# Patient Record
Sex: FEMALE | Race: WHITE | NOT HISPANIC OR LATINO | Employment: OTHER | URBAN - NONMETROPOLITAN AREA
[De-identification: names, ages, dates, MRNs, and addresses within clinical notes are randomized per-mention and may not be internally consistent; named-entity substitution may affect disease eponyms.]

---

## 2017-03-31 DIAGNOSIS — S83.249A TEAR OF MEDIAL MENISCUS OF KNEE: Primary | ICD-10-CM

## 2017-04-04 ENCOUNTER — HOSPITAL ENCOUNTER (OUTPATIENT)
Dept: MRI IMAGING | Facility: HOSPITAL | Age: 67
Discharge: HOME | End: 2017-04-04
Attending: SPECIALIST | Admitting: SPECIALIST
Payer: COMMERCIAL

## 2017-04-04 PROCEDURE — 73721 MRI JNT OF LWR EXTRE W/O DYE: CPT | Mod: TC,RT

## 2017-11-26 ENCOUNTER — HEALTH MAINTENANCE LETTER (OUTPATIENT)
Age: 67
End: 2017-11-26

## 2019-02-03 ENCOUNTER — HOSPITAL ENCOUNTER (EMERGENCY)
Facility: HOSPITAL | Age: 69
Discharge: HOME | End: 2019-02-03
Attending: NURSE PRACTITIONER | Admitting: NURSE PRACTITIONER
Payer: MEDICARE

## 2019-02-03 VITALS
HEART RATE: 81 BPM | OXYGEN SATURATION: 99 % | RESPIRATION RATE: 18 BRPM | SYSTOLIC BLOOD PRESSURE: 130 MMHG | DIASTOLIC BLOOD PRESSURE: 88 MMHG | TEMPERATURE: 97.6 F

## 2019-02-03 DIAGNOSIS — J11.1 INFLUENZA-LIKE ILLNESS: ICD-10-CM

## 2019-02-03 LAB
FLUAV+FLUBV RNA SPEC QL NAA+PROBE: NEGATIVE
FLUAV+FLUBV RNA SPEC QL NAA+PROBE: NEGATIVE
RSV RNA SPEC NAA+PROBE: NEGATIVE
SPECIMEN SOURCE: NORMAL

## 2019-02-03 PROCEDURE — G0463 HOSPITAL OUTPT CLINIC VISIT: HCPCS

## 2019-02-03 PROCEDURE — 87631 RESP VIRUS 3-5 TARGETS: CPT | Performed by: NURSE PRACTITIONER

## 2019-02-03 PROCEDURE — 99213 OFFICE O/P EST LOW 20 MIN: CPT | Performed by: NURSE PRACTITIONER

## 2019-02-03 RX ORDER — OSELTAMIVIR PHOSPHATE 75 MG/1
75 CAPSULE ORAL 2 TIMES DAILY
Qty: 10 CAPSULE | Refills: 0 | Status: SHIPPED | OUTPATIENT
Start: 2019-02-03 | End: 2019-02-08

## 2019-02-03 ASSESSMENT — ENCOUNTER SYMPTOMS
ACTIVITY CHANGE: 0
SORE THROAT: 0
NECK PAIN: 0
DYSURIA: 0
ABDOMINAL PAIN: 0
SINUS PRESSURE: 0
CHILLS: 0
TROUBLE SWALLOWING: 0
FEVER: 0
NECK STIFFNESS: 0
NAUSEA: 0
RHINORRHEA: 0
SINUS PAIN: 0
COUGH: 1
WHEEZING: 0
STRIDOR: 0
DIARRHEA: 0
HEADACHES: 1
WEAKNESS: 0
PSYCHIATRIC NEGATIVE: 1
APPETITE CHANGE: 0
VOMITING: 0
SHORTNESS OF BREATH: 0

## 2019-02-03 NOTE — ED AVS SNAPSHOT
[facility]  [address] [address]  Phone:  [phone]                                    Lea Foy   MRN: [id number]    Department:  [facility]   Date of Visit:  2/3/2019           After Visit Summary Signature Page    I have received my discharge instructions, and my questions have been answered. I have discussed any challenges I see with this plan with the nurse or doctor.    ..........................................................................................................................................  Patient/Patient Representative Signature      ..........................................................................................................................................  Patient Representative Print Name and Relationship to Patient    ..................................................               ................................................  Date                                   Time    ..........................................................................................................................................  Reviewed by Signature/Title    ...................................................              ..............................................  Date                                               Time          22EPIC Rev 08/18

## 2019-02-03 NOTE — ED TRIAGE NOTES
is admitted for Influenza A, yesterday. No symptoms currently, does have headache but states probably due to lack of sleep.  Inpatient Dr suggested she get seen in UC to possibly start on treatment proactively.

## 2019-02-03 NOTE — ED PROVIDER NOTES
History   No chief complaint on file.    The history is provided by the patient. No  was used.     Lea Foy is a 68 year old female who presents for possible influenza. Her  was hospitalized yesterday with influenza A. She has body aches and a frontal headache that started today. Denies fever, chills, or night sweats. Eating and drinking well. Bowel and bladder are working well. No antibiotic use in the past 30 days. She did receive an influenza vaccine this flu season.       Allergies:  Allergies   Allergen Reactions     Valdecoxib Swelling     Aleve      Oxycodone      Penicillin G Itching and Rash       Problem List:    There are no active problems to display for this patient.       Past Medical History:    Past Medical History:   Diagnosis Date     Chronic pain      Depression      Gastro-oesophageal reflux disease      Hyperlipidemia      Hypertension      PONV (postoperative nausea and vomiting)        Past Surgical History:    Past Surgical History:   Procedure Laterality Date     ARTHROPLASTY KNEE      left knee     ARTHROSCOPY SHOULDER DEBRIDEMENT       COLONOSCOPY       ENDOSCOPY UPPER, COLONOSCOPY, COMBINED  5/17/2013    Procedure: COMBINED ENDOSCOPY UPPER, COLONOSCOPY;  COLONOSCOPY, ESOPHAGOGASTRODUODENOSCOPY;  Surgeon: Prasanth Ayers MD;  Location: [facility]     GYN SURGERY      tubal ligation, hysterectomy       Family History:    No family history on file.    Social History:  Marital Status:   [2]  Social History     Tobacco Use     Smoking status: Never Smoker   Substance Use Topics     Alcohol use: No     Drug use: No        Medications:      oseltamivir (TAMIFLU) 75 MG capsule   ALPRAZolam (XANAX) 0.25 MG tablet   amitriptyline (ELAVIL) 25 MG tablet   Aspirin 81 MG TBDP   calcium citrate-vitamin D (CITRACAL) 315-200 MG-UNIT TABS   ESTRADIOL PO   Flaxseed, Linseed, (FLAX SEED OIL) 1000 MG capsule   Multiple Vitamins-Minerals (MULTIVITAMIN OR)   omeprazole  (PRILOSEC) 20 MG capsule   simvastatin (ZOCOR) 40 MG tablet   zolpidem (AMBIEN) 10 MG tablet         Review of Systems   Constitutional: Negative for activity change, appetite change, chills and fever.   HENT: Positive for congestion. Negative for ear discharge, ear pain, rhinorrhea, sinus pressure, sinus pain, sore throat and trouble swallowing.    Respiratory: Positive for cough. Negative for shortness of breath, wheezing and stridor.    Cardiovascular: Negative for chest pain.   Gastrointestinal: Negative for abdominal pain, diarrhea, nausea and vomiting.   Genitourinary: Negative for dysuria.   Musculoskeletal: Negative for neck pain and neck stiffness.        Body aches.    Skin: Negative for rash.   Neurological: Positive for headaches. Negative for weakness.        Frontal headache.    Psychiatric/Behavioral: Negative.        Physical Exam   BP: 130/88  Pulse: 81  Temp: 97.6  F (36.4  C)  Resp: 18  SpO2: 99 %      Physical Exam   Constitutional: She is oriented to person, place, and time. She appears well-developed and well-nourished. No distress.   HENT:   Head: Normocephalic.   Right Ear: External ear normal.   Left Ear: External ear normal.   Mouth/Throat: Oropharynx is clear and moist. No oropharyngeal exudate.   Eyes: Conjunctivae and EOM are normal. Pupils are equal, round, and reactive to light. Right eye exhibits no discharge. Left eye exhibits no discharge.   Neck: Normal range of motion. Neck supple.   Cardiovascular: Normal rate, regular rhythm and normal heart sounds.   No murmur heard.  Pulmonary/Chest: Effort normal. No stridor. No respiratory distress. She has no wheezes. She has no rales.   Abdominal: Soft. She exhibits no distension.   Musculoskeletal: Normal range of motion.   Lymphadenopathy:     She has no cervical adenopathy.   Neurological: She is alert and oriented to person, place, and time. She exhibits normal muscle tone. Coordination normal.   Skin: Skin is warm and dry. Capillary  refill takes less than 2 seconds. No rash noted. She is not diaphoretic.   Psychiatric: She has a normal mood and affect. Her behavior is normal.   Nursing note and vitals reviewed.      ED Course     Procedures    Results for orders placed or performed during the hospital encounter of 02/03/19   Influenza A and B and RSV PCR   Result Value Ref Range    Specimen Description Nares     Influenza A PCR Negative NEG^Negative    Influenza B PCR Negative NEG^Negative    Resp Syncytial Virus Negative NEG^Negative       Assessments & Plan (with Medical Decision Making)     Negative influenza swab. She has influenza like illness. Will treat with Tamiflu.     Discussed plan of care. She verbalized understanding. All questions answered.     I have reviewed the nursing notes.    I have reviewed the findings, diagnosis, plan and need for follow up with the patient.  Discharged in stable condition.        Medication List      Started    oseltamivir 75 MG capsule  Commonly known as:  TAMIFLU  75 mg, Oral, 2 TIMES DAILY            Final diagnoses:   Influenza-like illness     Take Tamiflu as ordered. Take with food.   Increase water intake.   Rest.   Good hand washing.   Follow up with PCP with any increase in symptoms or concerns.   Return to urgent care or emergency department with any increase in symptoms or concerns.     TD Finley  2/3/2019  9:40 AM  [facility]       Solange Lott NP  02/03/19 1958       Solange Lott NP  02/03/19 1959

## 2019-02-03 NOTE — ED TRIAGE NOTES
Pt in for complaints of exposure to influenza A. MD referred pt to UC for possible initiation of treatment.

## 2019-02-03 NOTE — DISCHARGE INSTRUCTIONS
Take Tamiflu as ordered. Take with food.   Increase water intake.   Rest.   Good hand washing.   Follow up with PCP with any increase in symptoms or concerns.   Return to urgent care or emergency department with any increase in symptoms or concerns.

## 2020-03-02 ENCOUNTER — HEALTH MAINTENANCE LETTER (OUTPATIENT)
Age: 70
End: 2020-03-02

## 2020-10-24 ENCOUNTER — ALLIED HEALTH/NURSE VISIT (OUTPATIENT)
Dept: FAMILY MEDICINE | Facility: OTHER | Age: 70
End: 2020-10-24
Attending: FAMILY MEDICINE
Payer: MEDICARE

## 2020-10-24 DIAGNOSIS — R09.81 NASAL CONGESTION: ICD-10-CM

## 2020-10-24 DIAGNOSIS — R50.9 FEVER: ICD-10-CM

## 2020-10-24 DIAGNOSIS — R05.9 COUGH: Primary | ICD-10-CM

## 2020-10-24 PROCEDURE — C9803 HOPD COVID-19 SPEC COLLECT: HCPCS

## 2020-10-24 PROCEDURE — U0003 INFECTIOUS AGENT DETECTION BY NUCLEIC ACID (DNA OR RNA); SEVERE ACUTE RESPIRATORY SYNDROME CORONAVIRUS 2 (SARS-COV-2) (CORONAVIRUS DISEASE [COVID-19]), AMPLIFIED PROBE TECHNIQUE, MAKING USE OF HIGH THROUGHPUT TECHNOLOGIES AS DESCRIBED BY CMS-2020-01-R: HCPCS | Mod: ZL | Performed by: FAMILY MEDICINE

## 2020-10-24 PROCEDURE — 99207 PR NO CHARGE NURSE ONLY: CPT

## 2020-10-24 NOTE — NURSING NOTE
Patient swabbed for COVID-19 testing.  Cough, congestion  Rosalina Moser LPN on 10/24/2020 at 3:05 PM

## 2020-10-26 LAB
SARS-COV-2 RNA SPEC QL NAA+PROBE: NOT DETECTED
SPECIMEN SOURCE: NORMAL

## 2021-01-03 ENCOUNTER — HOSPITAL ENCOUNTER (EMERGENCY)
Facility: HOSPITAL | Age: 71
Discharge: HOME | End: 2021-01-03
Attending: NURSE PRACTITIONER | Admitting: NURSE PRACTITIONER
Payer: MEDICARE

## 2021-01-03 VITALS
RESPIRATION RATE: 16 BRPM | OXYGEN SATURATION: 97 % | DIASTOLIC BLOOD PRESSURE: 85 MMHG | SYSTOLIC BLOOD PRESSURE: 132 MMHG | TEMPERATURE: 99 F | HEART RATE: 106 BPM

## 2021-01-03 DIAGNOSIS — J01.40 ACUTE NON-RECURRENT PANSINUSITIS: Primary | ICD-10-CM

## 2021-01-03 PROCEDURE — G0463 HOSPITAL OUTPT CLINIC VISIT: HCPCS

## 2021-01-03 PROCEDURE — 99213 OFFICE O/P EST LOW 20 MIN: CPT | Performed by: NURSE PRACTITIONER

## 2021-01-03 RX ORDER — DOXYCYCLINE HYCLATE 100 MG
100 TABLET ORAL 2 TIMES DAILY
Qty: 14 TABLET | Refills: 0 | Status: SHIPPED | OUTPATIENT
Start: 2021-01-03 | End: 2021-01-10

## 2021-01-03 ASSESSMENT — ENCOUNTER SYMPTOMS
CHILLS: 0
COUGH: 1
FEVER: 0
SINUS PRESSURE: 1
SHORTNESS OF BREATH: 0
SINUS PAIN: 1

## 2021-01-03 NOTE — ED PROVIDER NOTES
History     Chief Complaint   Patient presents with     Nasal Congestion     HPI  Lea Foy is a 70 year old female who presents to urgent care during current COVID-19 pandemic for concerns of a sinus infection.  Onset 1 week ago.  She reports right-sided frontal headache, right-sided facial pressure neck along with, tooth pain.  No fevers or chills.  This morning she states that she had blood tinged mucus when she blew her nose.  No recent head trauma.  No nosebleeds.  She is eating and drinking of minimal difficulty.  She does have a mild cough.  She notes that she tested negative for COVID-19 on 12/28/2020.  Patient notes she has not had a sinus infection for a long time.  She has tried Mucinex, OTC sinus/cold medication with minimal effectiveness.  Symptoms appear to be worsening.    Allergies:  Allergies   Allergen Reactions     Valdecoxib Swelling     Aleve      Oxycodone      Penicillin G Itching and Rash       Problem List:    There are no active problems to display for this patient.       Past Medical History:    Past Medical History:   Diagnosis Date     Chronic pain      Depression      Gastro-oesophageal reflux disease      Hyperlipidemia      Hypertension      PONV (postoperative nausea and vomiting)        Past Surgical History:    Past Surgical History:   Procedure Laterality Date     ARTHROPLASTY KNEE      left knee     ARTHROSCOPY SHOULDER DEBRIDEMENT       COLONOSCOPY       ENDOSCOPY UPPER, COLONOSCOPY, COMBINED  5/17/2013    Procedure: COMBINED ENDOSCOPY UPPER, COLONOSCOPY;  COLONOSCOPY, ESOPHAGOGASTRODUODENOSCOPY;  Surgeon: Prasanth Ayers MD;  Location: [facility]     GYN SURGERY      tubal ligation, hysterectomy       Family History:    No family history on file.    Social History:  Marital Status:   [2]  Social History     Tobacco Use     Smoking status: Never Smoker   Substance Use Topics     Alcohol use: No     Drug use: No        Medications:         doxycycline hyclate  (VIBRA-TABS) 100 MG tablet       ALPRAZolam (XANAX) 0.25 MG tablet       amitriptyline (ELAVIL) 25 MG tablet       Aspirin 81 MG TBDP       calcium citrate-vitamin D (CITRACAL) 315-200 MG-UNIT TABS       ESTRADIOL PO       Flaxseed, Linseed, (FLAX SEED OIL) 1000 MG capsule       Multiple Vitamins-Minerals (MULTIVITAMIN OR)       omeprazole (PRILOSEC) 20 MG capsule       simvastatin (ZOCOR) 40 MG tablet       zolpidem (AMBIEN) 10 MG tablet          Review of Systems   Constitutional: Negative for chills and fever.   HENT: Positive for congestion, sinus pressure and sinus pain.    Respiratory: Positive for cough. Negative for shortness of breath.    All other systems reviewed and are negative.      Physical Exam   BP: 132/85  Pulse: 106  Temp: 99  F (37.2  C)  Resp: 16  SpO2: 97 %      Physical Exam  Vitals signs and nursing note reviewed.   Constitutional:       Appearance: Normal appearance. She is not ill-appearing or toxic-appearing.   HENT:      Head: Normocephalic and atraumatic.      Jaw: No trismus.      Right Ear: Tympanic membrane and ear canal normal.      Left Ear: Tympanic membrane and ear canal normal.      Nose: Congestion present.      Right Nostril: No foreign body, epistaxis, septal hematoma or occlusion.      Left Nostril: No foreign body, epistaxis, septal hematoma or occlusion.      Right Turbinates: Not swollen.      Left Turbinates: Not swollen.      Right Sinus: No maxillary sinus tenderness or frontal sinus tenderness.      Left Sinus: No maxillary sinus tenderness or frontal sinus tenderness.      Mouth/Throat:      Mouth: Mucous membranes are moist.   Eyes:      Extraocular Movements: Extraocular movements intact.      Pupils: Pupils are equal, round, and reactive to light.   Neck:      Musculoskeletal: Normal range of motion.   Cardiovascular:      Rate and Rhythm: Normal rate and regular rhythm.      Heart sounds: Normal heart sounds.   Pulmonary:      Effort: Pulmonary effort is normal.       Breath sounds: Normal breath sounds.   Musculoskeletal: Normal range of motion.   Lymphadenopathy:      Cervical: No cervical adenopathy.   Skin:     General: Skin is warm and dry.      Capillary Refill: Capillary refill takes less than 2 seconds.   Neurological:      Mental Status: She is alert and oriented to person, place, and time.         ED Course        Procedures               No results found for this or any previous visit (from the past 24 hour(s)).    Medications - No data to display    Assessments & Plan (with Medical Decision Making)     I have reviewed the nursing notes.    I have reviewed the findings, diagnosis, plan and need for follow up with the patient.  Pleasant 70-year-old female that presented to urgent care for concerns of a sinus infection x1 week.  Tested negative for COVID-19 on 12/28/2020.  She does have nasal congestion.  No sinus tenderness to palpation.  Sinus pressure and pain is all on the right side of her face.  She has tried OTC medications with minimal effectiveness.  Discussed with patient that the sinus infections are likely viral.  Considering she has tried symptomatic treatment for 1 week with no significant improvements will prescribe an antibiotic.  Recommended she continue with oral decongestant, pushing fluids and try nasal washes.  Follow-up with PCP if no improvement in symptoms.  Return to ED/UC for worsening or concerning symptoms.  Patient verbalized understanding.    This document was prepared using a combination of typing and voice generated software.  While every attempt was made for accuracy, spelling and grammatical errors may exist.    New Prescriptions    DOXYCYCLINE HYCLATE (VIBRA-TABS) 100 MG TABLET    Take 1 tablet (100 mg) by mouth 2 times daily for 7 days       Final diagnoses:   Acute non-recurrent pansinusitis       1/3/2021   [facility]     Mpofu, Prudence, CNP  01/03/21 1102

## 2021-01-03 NOTE — ED AVS SNAPSHOT
[facility]  [address] [address]  Phone: [phone]                                    Lea Foy   MRN: [id number]    Department: [facility]   Date of Visit: 1/3/2021           After Visit Summary Signature Page    I have received my discharge instructions, and my questions have been answered. I have discussed any challenges I see with this plan with the nurse or doctor.    ..........................................................................................................................................  Patient/Patient Representative Signature      ..........................................................................................................................................  Patient Representative Print Name and Relationship to Patient    ..................................................               ................................................  Date                                   Time    ..........................................................................................................................................  Reviewed by Signature/Title    ...................................................              ..............................................  Date                                               Time          22EPIC Rev 08/18

## 2021-01-03 NOTE — DISCHARGE INSTRUCTIONS
Take antibiotic as prescribed until finished.  Drink plenty of fluids, do nasal saline washes and continue taking Mucinex.    Follow-up with your doctor as needed.    Return to emergency department for worsening concerning symptoms.

## 2021-01-03 NOTE — ED TRIAGE NOTES
PT presents today with c/o possible sinus infections started 4 days ago with head pressure and now she has bloody ,usuc when she blows her nose.

## 2021-04-18 ENCOUNTER — HEALTH MAINTENANCE LETTER (OUTPATIENT)
Age: 71
End: 2021-04-18

## 2021-06-14 ENCOUNTER — MEDICAL CORRESPONDENCE (OUTPATIENT)
Dept: HEALTH INFORMATION MANAGEMENT | Facility: HOSPITAL | Age: 71
End: 2021-06-14

## 2021-07-07 ENCOUNTER — HOSPITAL ENCOUNTER (OUTPATIENT)
Dept: BONE DENSITY | Facility: HOSPITAL | Age: 71
Discharge: HOME | End: 2021-07-07
Attending: FAMILY MEDICINE | Admitting: FAMILY MEDICINE
Payer: MEDICARE

## 2021-07-07 DIAGNOSIS — M81.0 OSTEOPOROSIS: ICD-10-CM

## 2021-07-07 PROCEDURE — 77080 DXA BONE DENSITY AXIAL: CPT

## 2021-10-03 ENCOUNTER — HEALTH MAINTENANCE LETTER (OUTPATIENT)
Age: 71
End: 2021-10-03

## 2022-03-19 ENCOUNTER — HEALTH MAINTENANCE LETTER (OUTPATIENT)
Age: 72
End: 2022-03-19

## 2022-05-14 ENCOUNTER — HEALTH MAINTENANCE LETTER (OUTPATIENT)
Age: 72
End: 2022-05-14

## 2022-09-04 ENCOUNTER — HEALTH MAINTENANCE LETTER (OUTPATIENT)
Age: 72
End: 2022-09-04

## 2023-06-03 ENCOUNTER — HEALTH MAINTENANCE LETTER (OUTPATIENT)
Age: 73
End: 2023-06-03

## 2023-08-18 ENCOUNTER — HOSPITAL ENCOUNTER (EMERGENCY)
Facility: HOSPITAL | Age: 73
Discharge: HOME | End: 2023-08-18
Attending: NURSE PRACTITIONER | Admitting: NURSE PRACTITIONER
Payer: MEDICARE

## 2023-08-18 ENCOUNTER — APPOINTMENT (OUTPATIENT)
Dept: GENERAL RADIOLOGY | Facility: HOSPITAL | Age: 73
End: 2023-08-18
Attending: NURSE PRACTITIONER
Payer: MEDICARE

## 2023-08-18 VITALS
SYSTOLIC BLOOD PRESSURE: 145 MMHG | RESPIRATION RATE: 18 BRPM | DIASTOLIC BLOOD PRESSURE: 87 MMHG | TEMPERATURE: 97.9 F | OXYGEN SATURATION: 95 % | HEART RATE: 88 BPM

## 2023-08-18 DIAGNOSIS — S82.65XA CLOSED NONDISPLACED FRACTURE OF LATERAL MALLEOLUS OF LEFT FIBULA, INITIAL ENCOUNTER: ICD-10-CM

## 2023-08-18 PROCEDURE — 99213 OFFICE O/P EST LOW 20 MIN: CPT | Performed by: NURSE PRACTITIONER

## 2023-08-18 PROCEDURE — G0463 HOSPITAL OUTPT CLINIC VISIT: HCPCS

## 2023-08-18 PROCEDURE — 73610 X-RAY EXAM OF ANKLE: CPT | Mod: LT

## 2023-08-18 RX ORDER — HYDROCODONE BITARTRATE AND ACETAMINOPHEN 5; 325 MG/1; MG/1
1 TABLET ORAL EVERY 4 HOURS PRN
Qty: 10 TABLET | Refills: 0 | Status: SHIPPED | OUTPATIENT
Start: 2023-08-18 | End: 2023-08-21

## 2023-08-18 NOTE — ED PROVIDER NOTES
History     Chief Complaint   Patient presents with    Ankle Pain     HPI  Lea Foy is a 72 year old female who presents today with a CC of left ankle pain.  She inverted her left ankle walking this afternoon about an hour prior to presentation.  She reports she felt a pop in the lateral portion of her ankle.    The pain is 2/10, she was able to hobble on it with minimal weight bearing without much pain.  No numbness or tingling.  She has not taken anything for pain.      No history of ankle injury or surgery.      Allergies:  Allergies   Allergen Reactions    Valdecoxib Swelling    Aleve     Dextran     Naproxen Other (See Comments)     GI upset    Oxycodone     Penicillins     Penicillin G Itching and Rash       Problem List:    There are no problems to display for this patient.       Past Medical History:    Past Medical History:   Diagnosis Date    Chronic pain     Depression     Gastro-oesophageal reflux disease     Hyperlipidemia     Hypertension     PONV (postoperative nausea and vomiting)        Past Surgical History:    Past Surgical History:   Procedure Laterality Date    ARTHROPLASTY KNEE      left knee    ARTHROSCOPY SHOULDER DEBRIDEMENT      COLONOSCOPY      ENDOSCOPY UPPER, COLONOSCOPY, COMBINED  5/17/2013    Procedure: COMBINED ENDOSCOPY UPPER, COLONOSCOPY;  COLONOSCOPY, ESOPHAGOGASTRODUODENOSCOPY;  Surgeon: Prasanth Ayers MD;  Location: [facility]    GYN SURGERY      tubal ligation, hysterectomy       Family History:    No family history on file.    Social History:  Marital Status:   [2]  Social History     Tobacco Use    Smoking status: Never   Substance Use Topics    Alcohol use: No    Drug use: No        Medications:    ALPRAZolam (XANAX) 0.25 MG tablet  amitriptyline (ELAVIL) 25 MG tablet  Aspirin 81 MG TBDP  calcium citrate-vitamin D (CITRACAL) 315-200 MG-UNIT TABS  ESTRADIOL PO  Flaxseed, Linseed, (FLAX SEED OIL) 1000 MG capsule  HYDROcodone-acetaminophen (NORCO) 5-325 MG  tablet  Multiple Vitamins-Minerals (MULTIVITAMIN OR)  omeprazole (PRILOSEC) 20 MG capsule  simvastatin (ZOCOR) 40 MG tablet  zolpidem (AMBIEN) 10 MG tablet          Review of Systems   Constitutional:  Negative for activity change, appetite change, fatigue and fever.   Musculoskeletal:  Positive for arthralgias.   Neurological:  Negative for numbness.       Physical Exam   BP: 145/87  Pulse: 88  Temp: 97.9  F (36.6  C)  Resp: 18  SpO2: 95 %      Physical Exam  Vitals and nursing note reviewed.   Constitutional:       Appearance: Normal appearance.   HENT:      Head: Normocephalic and atraumatic.   Musculoskeletal:      Left ankle: Swelling present. No deformity, ecchymosis or lacerations. Tenderness present over the lateral malleolus. Normal range of motion.      Comments: Left ankle skin is intact, no erythema, no ecchymosis.  There is localized tenderness over the lateral malleolus.  There is also localized swelling over this area.  She has good sensation to light touch.  Pedal pulse is intact.   Neurological:      Mental Status: She is alert.         ED Course     Results for orders placed or performed during the hospital encounter of 08/18/23 (from the past 24 hour(s))   XR Ankle Left G/E 3 Views    Narrative    PROCEDURE:  XR ANKLE LEFT G/E 3 VIEWS    HISTORY: pt fell and twisted ankle having ankle pain and some swelling  no previous hx of fracture    COMPARISON:  None.    TECHNIQUE:  XR ANKLE LEFT 3 VIEWS    FINDINGS:   Acute fracture through the inferior aspect of lateral malleolus  extending to the talofibular joint space. No dislocation. The joint  spaces are preserved.     No foreign body is seen.     Soft tissue swelling about the ankle.        Impression    IMPRESSION:   Acute fracture through the inferior lateral malleolus.    VINEET CAMEJO MD         SYSTEM ID:  [id number]       Medications - No data to display    Assessments & Plan (with Medical Decision Making)     I have reviewed the nursing  notes.    I have reviewed the findings, diagnosis, plan and need for follow up with the patient.    Medical Decision Making  The patient's presentation was of low complexity (an acute and uncomplicated illness or injury).    The patient's evaluation involved:  ordering and/or review of 1 test(s) in this encounter (see separate area of note for details)    The patient's management necessitated moderate risk (prescription drug management including medications given in the ED).        Discharge Medication List as of 8/18/2023  6:02 PM        START taking these medications    Details   HYDROcodone-acetaminophen (NORCO) 5-325 MG tablet Take 1 tablet by mouth every 4 hours as needed for severe pain, Disp-10 tablet, R-0, E-Prescribe             Final diagnoses:   Closed nondisplaced fracture of lateral malleolus of left fibula, initial encounter     Rest, ice, elevate as much as possible.  Ice on for 20 minutes every hour.  Use over-the-counter analgesics per package directions for mild to moderate pain.  Hydrocodone prescribed as needed for severe pain.  [facility] given [facility].  I also gave her a DME order for knee scooter.  She declines need for crutches at this time, states that she has been at home.  She was fitted with a Ortho boot.      8/18/2023   [facility]       Deepthi Hankins NP  08/19/23 9428

## 2023-08-18 NOTE — ED TRIAGE NOTES
Pt presents with c/o having left ankle pain due to stepping in a hole and rolling it   Pt states hearing a pop and a crack  Pt states is able to bear some weight on it  Limited ROM due to pain   Denies any previous hx of fracture or injury to ankle   Happened today   No otc meds taken today has been icing, which did help some

## 2023-08-19 ASSESSMENT — ENCOUNTER SYMPTOMS
ACTIVITY CHANGE: 0
FATIGUE: 0
ARTHRALGIAS: 1
FEVER: 0
NUMBNESS: 0
APPETITE CHANGE: 0

## 2023-08-22 DIAGNOSIS — R06.02 SOB (SHORTNESS OF BREATH): Primary | ICD-10-CM

## 2023-09-12 DIAGNOSIS — R06.02 SOB (SHORTNESS OF BREATH): Primary | ICD-10-CM

## 2023-10-24 ENCOUNTER — HOSPITAL ENCOUNTER (OUTPATIENT)
Dept: RESPIRATORY THERAPY | Facility: HOSPITAL | Age: 73
Discharge: HOME | End: 2023-10-24
Attending: FAMILY MEDICINE | Admitting: INTERNAL MEDICINE
Payer: MEDICARE

## 2023-10-24 DIAGNOSIS — R06.02 SOB (SHORTNESS OF BREATH): Primary | ICD-10-CM

## 2023-10-24 LAB
ALLEN'S TEST: YES
BASE EXCESS BLDA CALC-SCNC: 1.4 MMOL/L (ref -9–1.8)
HCO3 BLD-SCNC: 26 MMOL/L (ref 21–28)
HGB BLD-MCNC: 14.7 G/DL (ref 11.7–15.7)
O2/TOTAL GAS SETTING VFR VENT: 21 %
OXYHGB MFR BLD: 95 % (ref 92–100)
PCO2 BLD: 38 MM HG (ref 35–45)
PH BLD: 7.44 [PH] (ref 7.35–7.45)
PO2 BLD: 73 MM HG (ref 80–105)

## 2023-10-24 PROCEDURE — 94726 PLETHYSMOGRAPHY LUNG VOLUMES: CPT

## 2023-10-24 PROCEDURE — 94010 BREATHING CAPACITY TEST: CPT

## 2023-10-24 PROCEDURE — 94729 DIFFUSING CAPACITY: CPT

## 2023-10-24 PROCEDURE — 94729 DIFFUSING CAPACITY: CPT | Mod: 26 | Performed by: INTERNAL MEDICINE

## 2023-10-24 PROCEDURE — 94010 BREATHING CAPACITY TEST: CPT | Mod: 26 | Performed by: INTERNAL MEDICINE

## 2023-10-24 PROCEDURE — 82805 BLOOD GASES W/O2 SATURATION: CPT | Performed by: FAMILY MEDICINE

## 2023-10-24 PROCEDURE — 94726 PLETHYSMOGRAPHY LUNG VOLUMES: CPT | Mod: 26 | Performed by: INTERNAL MEDICINE

## 2023-10-24 PROCEDURE — 85018 HEMOGLOBIN: CPT | Performed by: FAMILY MEDICINE

## 2023-10-24 PROCEDURE — 36600 WITHDRAWAL OF ARTERIAL BLOOD: CPT

## 2024-04-27 ENCOUNTER — HEALTH MAINTENANCE LETTER (OUTPATIENT)
Age: 74
End: 2024-04-27

## 2024-06-17 ENCOUNTER — MEDICAL CORRESPONDENCE (OUTPATIENT)
Dept: MRI IMAGING | Facility: HOSPITAL | Age: 74
End: 2024-06-17

## 2024-07-06 ENCOUNTER — HEALTH MAINTENANCE LETTER (OUTPATIENT)
Age: 74
End: 2024-07-06

## 2024-07-09 ENCOUNTER — MEDICAL CORRESPONDENCE (OUTPATIENT)
Dept: HEALTH INFORMATION MANAGEMENT | Facility: HOSPITAL | Age: 74
End: 2024-07-09

## 2024-07-29 ENCOUNTER — HOSPITAL ENCOUNTER (OUTPATIENT)
Dept: BONE DENSITY | Facility: HOSPITAL | Age: 74
Discharge: HOME | End: 2024-07-29
Attending: FAMILY MEDICINE | Admitting: FAMILY MEDICINE
Payer: MEDICARE

## 2024-07-29 DIAGNOSIS — M81.0 SENILE OSTEOPOROSIS: ICD-10-CM

## 2024-07-29 PROCEDURE — 77080 DXA BONE DENSITY AXIAL: CPT

## 2025-07-13 ENCOUNTER — HEALTH MAINTENANCE LETTER (OUTPATIENT)
Age: 75
End: 2025-07-13